# Patient Record
Sex: FEMALE | Race: OTHER | URBAN - METROPOLITAN AREA
[De-identification: names, ages, dates, MRNs, and addresses within clinical notes are randomized per-mention and may not be internally consistent; named-entity substitution may affect disease eponyms.]

---

## 2018-08-29 ENCOUNTER — EMERGENCY (EMERGENCY)
Facility: HOSPITAL | Age: 28
LOS: 1 days | Discharge: HOME | End: 2018-08-29
Attending: EMERGENCY MEDICINE | Admitting: EMERGENCY MEDICINE
Payer: MEDICAID

## 2018-08-29 VITALS
RESPIRATION RATE: 16 BRPM | DIASTOLIC BLOOD PRESSURE: 87 MMHG | HEART RATE: 137 BPM | OXYGEN SATURATION: 98 % | SYSTOLIC BLOOD PRESSURE: 141 MMHG | TEMPERATURE: 98 F

## 2018-08-29 VITALS
SYSTOLIC BLOOD PRESSURE: 107 MMHG | OXYGEN SATURATION: 100 % | HEART RATE: 57 BPM | DIASTOLIC BLOOD PRESSURE: 62 MMHG | RESPIRATION RATE: 16 BRPM

## 2018-08-29 PROCEDURE — 70486 CT MAXILLOFACIAL W/O DYE: CPT | Mod: 26

## 2018-08-29 PROCEDURE — 99053 MED SERV 10PM-8AM 24 HR FAC: CPT

## 2018-08-29 PROCEDURE — 70450 CT HEAD/BRAIN W/O DYE: CPT | Mod: 26

## 2018-08-29 PROCEDURE — 99284 EMERGENCY DEPT VISIT MOD MDM: CPT | Mod: 25

## 2018-08-29 RX ORDER — IBUPROFEN 200 MG
400 TABLET ORAL ONCE
Qty: 0 | Refills: 0 | Status: COMPLETED | OUTPATIENT
Start: 2018-08-29 | End: 2018-08-29

## 2018-08-29 RX ORDER — ACETAMINOPHEN 500 MG
975 TABLET ORAL ONCE
Qty: 0 | Refills: 0 | Status: COMPLETED | OUTPATIENT
Start: 2018-08-29 | End: 2018-08-29

## 2018-08-29 RX ADMIN — Medication 975 MILLIGRAM(S): at 07:00

## 2018-08-29 RX ADMIN — Medication 400 MILLIGRAM(S): at 07:00

## 2018-08-29 NOTE — ED PROVIDER NOTE - PHYSICAL EXAMINATION
Oropharynx NORMAL, no missing teeth, normal alignment of teeth, no gum lacs, no bleeding gums. Tongue depressor test NEG. Stable mandible.
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
## 2018-08-29 NOTE — ED PROVIDER NOTE - MUSCULOSKELETAL, MLM
Spine appears normal, range of motion is not limited, no muscle or joint tenderness, FROM RUE/RLE, pelvis stable, C/T/L spine midline non tender, no stepoffs.
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
## 2018-08-29 NOTE — ED PROVIDER NOTE - PLAN OF CARE
-- Follow up with your primary doctor within 2-3 days. Bring a copy of your results from today and discuss them with your doctor.   -- Return to ER immediately for new or worsening symptoms, any urgent issues, or for any concerns.

## 2018-08-29 NOTE — ED PROVIDER NOTE - SKIN, MLM
Skin normal color for race, warm, dry and intact. No evidence of rash. Abrasion and ecchymosis right neck, no lacerations
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
## 2019-01-12 ENCOUNTER — EMERGENCY (EMERGENCY)
Facility: HOSPITAL | Age: 29
LOS: 0 days | Discharge: HOME | End: 2019-01-12
Attending: EMERGENCY MEDICINE
Payer: MEDICAID

## 2019-01-12 VITALS
HEART RATE: 101 BPM | SYSTOLIC BLOOD PRESSURE: 131 MMHG | OXYGEN SATURATION: 100 % | DIASTOLIC BLOOD PRESSURE: 86 MMHG | HEIGHT: 63 IN | RESPIRATION RATE: 20 BRPM | WEIGHT: 149.91 LBS | TEMPERATURE: 98 F

## 2019-01-12 DIAGNOSIS — F41.0 PANIC DISORDER [EPISODIC PAROXYSMAL ANXIETY]: ICD-10-CM

## 2019-01-12 LAB — GLUCOSE BLDC GLUCOMTR-MCNC: 122 MG/DL — HIGH (ref 70–99)

## 2019-01-12 PROCEDURE — 99284 EMERGENCY DEPT VISIT MOD MDM: CPT

## 2019-01-12 RX ORDER — DIAZEPAM 5 MG
2 TABLET ORAL ONCE
Qty: 0 | Refills: 0 | Status: DISCONTINUED | OUTPATIENT
Start: 2019-01-12 | End: 2019-01-12
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
## 2019-01-12 NOTE — ED PROVIDER NOTE - MEDICAL DECISION MAKING DETAILS
27 yo F with panic attack, r/o metabolic cause/drug  -basic labs, hcg, drug screen, ua, cx, coags, mag, ekg, diazepam, finger stick  -f/u results, reeval

## 2019-01-12 NOTE — ED ADULT NURSE NOTE - NSIMPLEMENTINTERV_GEN_ALL_ED
Implemented All Universal Safety Interventions:  [place] to call system. Call bell, personal items and telephone within reach. Instruct patient to call for assistance. Room bathroom lighting operational. Non-slip footwear when patient is off stretcher. Physically safe environment: no spills, clutter or unnecessary equipment. Stretcher in lowest position, wheels locked, appropriate side rails in place.

## 2019-01-12 NOTE — ED PROVIDER NOTE - PHYSICAL EXAMINATION
Vitals: WNL  Gen: AAOx3, NAD, sitting uncomfortably in stretcher, appears anxious, cooperative, non-toxic   Head: ncat, perrla, eomi b/l  Neck: supple, no lymphadenopathy, no midline deviation  Heart: rrr, no m/r/g  Lungs: CTA b/l, no rales/ronchi/wheezes  Abd: soft, nontender, non-distended, no rebound or guarding  Ext: no clubbing/cyanosis/edema  Neuro: sensation and muscle strength intact b/l, steady gait

## 2019-01-12 NOTE — ED PROVIDER NOTE - PROGRESS NOTE DETAILS
pt refused labs, pt. is of sound mind and decision-making capacity as evidenced by behavior in ED  Pt. reports feeling better after resting in ER, symptoms resolved; she denies SI/HI/hallucinations, pt. ambulating with steady gait, doesn't appear intoxicated, wants to leave  pt. agrees to f/u with primary care outpt.  pt. understands to return to ED if symptoms worsen; will d/c

## 2021-09-13 NOTE — ED ADULT NURSE NOTE - NSIMPLEMENTINTERV_GEN_ALL_ED
Implemented All Universal Safety Interventions:  [place] to call system. Call bell, personal items and telephone within reach. Instruct patient to call for assistance. Room bathroom lighting operational. Non-slip footwear when patient is off stretcher. Physically safe environment: no spills, clutter or unnecessary equipment. Stretcher in lowest position, wheels locked, appropriate side rails in place. no

## 2021-10-15 NOTE — ED PROVIDER NOTE - CPE EDP CARDIAC NORM
"  Subjective:     Tamera Jha is a 45 y.o. female who presents for Knee Pain (R knee pain, x1week )      Fell, tripped over walkway. States she fell directly on to both knees. Tightness in left knee. Ache in right knee, was locking up. Wobbley, bilateral. Left hip pain. Occasional coldness in left leg. States it's \"Hard to walk\". Was sent home form work.         Knee Pain  This is a new problem. The current episode started in the past 7 days. The problem occurs constantly. The problem has been gradually improving. Associated symptoms include joint swelling. Pertinent negatives include no numbness. Nothing aggravates the symptoms. She has tried rest for the symptoms. The treatment provided mild relief.       Past Medical History:   Diagnosis Date   • Diabetes (HCC)    • GDM, class B1 3/31/2016   • Hypertension     preeclampsia    • Polyhydramnios in third trimester, antepartum complication 3/31/2016       Past Surgical History:   Procedure Laterality Date   • APPENDECTOMY     • CHOLECYSTECTOMY     • RHINOPLASTY     • TONSILLECTOMY         Social History     Socioeconomic History   • Marital status: Single     Spouse name: Not on file   • Number of children: Not on file   • Years of education: Not on file   • Highest education level: Not on file   Occupational History   • Not on file   Tobacco Use   • Smoking status: Never Smoker   • Smokeless tobacco: Never Used   Substance and Sexual Activity   • Alcohol use: No   • Drug use: No   • Sexual activity: Yes     Partners: Male     Comment: none    Other Topics Concern   • Not on file   Social History Narrative   • Not on file     Social Determinants of Health     Financial Resource Strain:    • Difficulty of Paying Living Expenses:    Food Insecurity:    • Worried About Running Out of Food in the Last Year:    • Ran Out of Food in the Last Year:    Transportation Needs:    • Lack of Transportation (Medical):    • Lack of Transportation (Non-Medical):    Physical " "Activity:    • Days of Exercise per Week:    • Minutes of Exercise per Session:    Stress:    • Feeling of Stress :    Social Connections:    • Frequency of Communication with Friends and Family:    • Frequency of Social Gatherings with Friends and Family:    • Attends [employer] Services:    • Active Member of Clubs or Organizations:    • Attends Club or Organization Meetings:    • Marital Status:    Intimate Partner Violence:    • Fear of Current or Ex-Partner:    • Emotionally Abused:    • Physically Abused:    • Sexually Abused:         Family History   Problem Relation Age of Onset   • Hypertension Mother    • Diabetes Mother    • Other Father    • Heart Disease Father         Allergies   Allergen Reactions   • Levaquin        Review of Systems   Musculoskeletal: Positive for falls, joint pain and joint swelling.   Neurological: Negative for sensory change, loss of consciousness and numbness.   All other systems reviewed and are negative.       Objective:   /86   Pulse 67   Temp 36.8 °C (98.2 °F) (Temporal)   Resp 16   Ht 1.651 m (5' 5\")   Wt 84.8 kg (187 lb)   SpO2 96%   BMI 31.12 kg/m²     Physical Exam  Vitals reviewed.   Constitutional:       General: She is not in acute distress.     Appearance: She is well-developed.   HENT:      Head: Normocephalic and atraumatic.   Cardiovascular:      Rate and Rhythm: Normal rate.   Pulmonary:      Effort: Pulmonary effort is normal.   Musculoskeletal:         General: Swelling, tenderness and signs of injury present. Normal range of motion.      Cervical back: Normal range of motion.      Left hip: Tenderness and bony tenderness present. No crepitus. Normal range of motion.      Right knee: Bony tenderness present. Tenderness present. No medial joint line or lateral joint line tenderness.      Left knee: Swelling and bony tenderness present. No erythema. Tenderness present over the medial joint line and lateral joint line.      Comments: Mild bruising over " bilateral patella, TTP. Right medial proximal TTP.  Left lateral proximal TTP    Lateral and anterior left hip tenderness to palpation. Reported joint pain with isolated ROM of hip.    Skin:     General: Skin is warm and dry.      Findings: Bruising present. No abrasion, erythema or laceration.   Neurological:      General: No focal deficit present.      Mental Status: She is alert and oriented to person, place, and time.      GCS: GCS eye subscore is 4. GCS verbal subscore is 5. GCS motor subscore is 6.   Psychiatric:         Mood and Affect: Mood normal.         Speech: Speech normal.         Behavior: Behavior normal.         Thought Content: Thought content normal.         Judgment: Judgment normal.         Assessment/Plan:   1. Fall, initial encounter    2. Knee strain, left, initial encounter  - DX-KNEE COMPLETE 4+ LEFT; Future  - REFERRAL TO ORTHOPEDICS  - naproxen (NAPROSYN) 375 MG Tab; Take 1 Tablet by mouth 2 times a day with meals for 14 days.  Dispense: 28 Tablet; Refill: 0    3. Knee effusion, left  - DX-KNEE COMPLETE 4+ LEFT; Future  - REFERRAL TO ORTHOPEDICS  - naproxen (NAPROSYN) 375 MG Tab; Take 1 Tablet by mouth 2 times a day with meals for 14 days.  Dispense: 28 Tablet; Refill: 0    4. Contusion of right knee, initial encounter  - DX-KNEE COMPLETE 4+ RIGHT; Future  - REFERRAL TO ORTHOPEDICS  - naproxen (NAPROSYN) 375 MG Tab; Take 1 Tablet by mouth 2 times a day with meals for 14 days.  Dispense: 28 Tablet; Refill: 0    5. Acute pain of right knee  - DX-KNEE COMPLETE 4+ RIGHT; Future  - REFERRAL TO ORTHOPEDICS  - naproxen (NAPROSYN) 375 MG Tab; Take 1 Tablet by mouth 2 times a day with meals for 14 days.  Dispense: 28 Tablet; Refill: 0    6. Hip pain  - DX-HIP-UNILATERAL-WITH PELVIS-1 VIEW LEFT; Future  - naproxen (NAPROSYN) 375 MG Tab; Take 1 Tablet by mouth 2 times a day with meals for 14 days.  Dispense: 28 Tablet; Refill: 0  DX-KNEE COMPLETE 4+ RIGHT    Result Date: 10/14/2021  10/14/2021 6:19  PM HISTORY/REASON FOR EXAM:  Pain/Deformity Following Trauma TECHNIQUE/EXAM DESCRIPTION AND NUMBER OF VIEWS:  4 views of the RIGHT knee. COMPARISON: None FINDINGS: There is no evidence of fracture or dislocation. There is mild tibiofemoral joint space narrowing. There is spurring of the femoral condyles, tibial plateau and patella. There is mild patellofemoral joint space narrowing. No joint effusion is seen.     1.  No evidence of acute fracture or dislocation. 2.  Degenerative changes as above described.     DX-KNEE COMPLETE 4+ LEFT    Result Date: 10/14/2021  10/14/2021 6:19 PM HISTORY/REASON FOR EXAM:  Pain/Deformity Following Trauma TECHNIQUE/EXAM DESCRIPTION AND NUMBER OF VIEWS:  4 views of the LEFT knee. COMPARISON: None FINDINGS: There is no evidence of acute fracture or dislocation. There is mild medial tibiofemoral joint space narrowing. There is spurring of the medial femoral condyle and tibial plateau as well as the tibial spines. There is mild right patellofemoral joint space narrowing. There is minimal spurring of the superior patella on the left. There is a small to moderate joint effusion. Calcific density adjacent to the medial femoral condyle may be related to prior ligamentous injury.     1.  No evidence of acute fracture or dislocation. 2.  Degenerative changes as above described. 3.  Small-to-moderate joint effusion. 4.  Small calcific density adjacent to the medial femoral condyle may be related to prior ligamentous injury.     DX-HIP-UNILATERAL-WITH PELVIS-1 VIEW LEFT    Result Date: 10/14/2021  10/14/2021 6:20 PM HISTORY/REASON FOR EXAM:  Pelvic/Hip Pain Following Trauma. TECHNIQUE/EXAM DESCRIPTION AND NUMBER OF VIEWS:  2 views of the LEFT hip. COMPARISON: None FINDINGS: Femoral heads are seated within the acetabula. Joint spaces are maintained. No fracture or dislocation is seen. There is no diastases of the symphysis pubis. SI joints are symmetric.     No fracture or dislocation is  seen.    -NSAID's (ibuprofen) and tylenol as directed for pain and inflammation.   -RICE Therapy: Rest, Ice, Compression, Elevation  -Left knee brace    Follow up emergently for severe uncontrolled pain, neurovascular compromise (decreased sensation, motion, or circulation). Patient is requesting time off until she can get in to see Ortho, stating her job is 10 hrs of walking. Discussed limited use of NSAIDs.     Differential diagnosis, natural history, supportive care, and indications for immediate follow-up discussed.   normal...
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
## 2025-03-03 NOTE — ED PROVIDER NOTE - DISPOSITION TYPE
[Time Spent: ___ minutes] : I have spent [unfilled] minutes of time on the encounter which excludes teaching and separately reported services. DISCHARGE